# Patient Record
Sex: FEMALE | Race: WHITE | NOT HISPANIC OR LATINO | URBAN - METROPOLITAN AREA
[De-identification: names, ages, dates, MRNs, and addresses within clinical notes are randomized per-mention and may not be internally consistent; named-entity substitution may affect disease eponyms.]

---

## 2017-01-05 ENCOUNTER — IMPORTED ENCOUNTER (OUTPATIENT)
Dept: URBAN - METROPOLITAN AREA CLINIC 9 | Facility: CLINIC | Age: 67
End: 2017-01-05

## 2017-05-08 NOTE — PATIENT DISCUSSION
AMD (Wet) Counseling:  I have reviewed with the patient the diagnosis of wet macular degeneration and its pathophysiology.   I further explained that this condition is a severe eye disease which should be monitored and treated by a retinal specialist.

## 2017-10-30 NOTE — PATIENT DISCUSSION
Continue: PreserVision AREDS 2 (vit c,k-hb-bdwbr-lutein-zeaxan): capsule: 334-225-66-0 mg-unit-mg-mg 1 capsule twice a day as directed by mouth

## 2017-10-30 NOTE — PATIENT DISCUSSION
STABLE NON-EXUDATIVE AMD,OS:  PINPOINT MACULAR INTRARETINAL HEMORRHAGE OS (AMD VS. NPDR). CONTINUE AREDS 2 VITAMINS / AMSLER GRID QD/ UV PROTECTION. SMOKING AVOIDANCE REVIEWED. RETURN FOR FOLLOW-UP AS SCHEDULED.

## 2017-10-30 NOTE — PATIENT DISCUSSION
AMD (WET), OD:  REVIEWED RISK AND BENEFITS OF TREAT AND EXTEND REGIMEN VS PRN TREATMENT. THE PATIENT PREFERS TO CONTINUE WITH PRN TREATMENT.

## 2018-02-26 NOTE — PATIENT DISCUSSION
NONPROLIFERATIVE DIABETIC RETINOPATHY, OU:  STABLE.  RETURN FOR FOLLOW-UP AS SCHEDULED FOR DILATED EYE EXAM.

## 2018-02-26 NOTE — PATIENT DISCUSSION
AMD (WET), OS:  REVIEWED RISK AND BENEFITS OF TREAT AND EXTEND REGIMEN VS PRN TREATMENT. THE PATIENT PREFERS TO CONTINUE WITH PRN TREATMENT.

## 2018-02-26 NOTE — PATIENT DISCUSSION
Continue: PreserVision AREDS 2 (vit c,e-jk-voxtj-lutein-zeaxan): capsule: 515-798-53-1 mg-unit-mg-mg 1 capsule twice a day as directed by mouth

## 2018-02-26 NOTE — PATIENT DISCUSSION
STABLE NON-EXUDATIVE AMD OD:  CONTINUE AREDS 2 VITAMINS / AMSLER GRID QD/ UV PROTECTION. SMOKING AVOIDANCE REVIEWED. RETURN FOR FOLLOW-UP AS SCHEDULED.

## 2018-06-04 NOTE — PATIENT DISCUSSION
Continue: PreserVision AREDS 2 (vit c,d-lg-niypt-lutein-zeaxan): capsule: 283-783-81-1 mg-unit-mg-mg 1 capsule twice a day as directed by mouth

## 2018-09-06 NOTE — PATIENT DISCUSSION
Continue: PreserVision AREDS 2 (vit c,z-ah-pvwwc-lutein-zeaxan): capsule: 940-570-72-9 mg-unit-mg-mg 1 capsule twice a day as directed by mouth

## 2018-12-10 ENCOUNTER — IMPORTED ENCOUNTER (OUTPATIENT)
Dept: URBAN - METROPOLITAN AREA CLINIC 9 | Facility: CLINIC | Age: 68
End: 2018-12-10

## 2018-12-20 NOTE — PATIENT DISCUSSION
Continue: PreserVision AREDS 2 (vit c,e-up-rjjnm-lutein-zeaxan): capsule: 533-258-65-5 mg-unit-mg-mg 1 capsule twice a day as directed by mouth

## 2019-04-11 NOTE — PATIENT DISCUSSION
Continue: PreserVision AREDS 2 (vit c,y-hi-lkstl-lutein-zeaxan): capsule: 499-140-92-0 mg-unit-mg-mg 1 capsule twice a day as directed by mouth

## 2019-07-11 NOTE — PATIENT DISCUSSION
Continue: PreserVision AREDS 2 (vit c,b-ff-uvkdm-lutein-zeaxan): capsule: 472-358-92-4 mg-unit-mg-mg 1 capsule twice a day as directed by mouth spouse

## 2019-07-11 NOTE — PATIENT DISCUSSION
DEGENERATIVE MYOPIA, OU: REGRESSED CNV OD; NEW CNV OS WITH SUBRETINAL HEMORRHAGE ON EXAM TODAY. FOLLOW-UP AS SCHEDULED. Detail Level: Detailed Size Of Lesion: 9mm Size Of Lesion: 2mm Size Of Lesion: 5mm Size Of Lesion: 4mm

## 2019-10-31 NOTE — PATIENT DISCUSSION
Continue: PreserVision AREDS 2 (vit c,c-xw-gbwfj-lutein-zeaxan): capsule: 451-942-51-3 mg-unit-mg-mg 1 capsule twice a day as directed by mouth

## 2019-12-12 NOTE — PATIENT DISCUSSION
Continue: Refresh Optive Advanced (nfjlukxipmxmqbw-gfmlycm-[id number]): drops: 0.5-1-0.5% 1 drop as directed into both eyes

## 2019-12-12 NOTE — PATIENT DISCUSSION
Continue: PreserVision AREDS 2 (vit c,w-sc-lgviy-lutein-zeaxan): capsule: 422-448-15-2 mg-unit-mg-mg 1 capsule twice a day as directed by mouth

## 2020-01-27 NOTE — PATIENT DISCUSSION
Continue: PreserVision AREDS 2 (vit c,h-ex-nhccu-lutein-zeaxan): capsule: 986-430-51-9 mg-unit-mg-mg 1 capsule twice a day as directed by mouth

## 2020-01-27 NOTE — PATIENT DISCUSSION
Continue: Refresh Optive Advanced (abnqczfowceakoy-aftwpqb-aeaj54): drops: 0.5-1-0.5% 1 drop as directed into both eyes

## 2020-02-13 ENCOUNTER — IMPORTED ENCOUNTER (OUTPATIENT)
Dept: URBAN - METROPOLITAN AREA CLINIC 9 | Facility: CLINIC | Age: 70
End: 2020-02-13

## 2020-04-27 NOTE — PATIENT DISCUSSION
AMD (Wet) Counseling:  I have reviewed with the patient the diagnosis of wet macular degeneration and its pathophysiology.   I further explained that this condition is a severe eye disease which should be monitored and treated by a retinal specialist. 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  Had asked the [facility] if they would do it and they won't.  Schedule patient to see Mary May 5th with a f/u at the lab.

## 2021-02-25 ENCOUNTER — IMPORTED ENCOUNTER (OUTPATIENT)
Dept: URBAN - METROPOLITAN AREA CLINIC 9 | Facility: CLINIC | Age: 71
End: 2021-02-25

## 2021-06-09 ENCOUNTER — IMPORTED ENCOUNTER (OUTPATIENT)
Dept: URBAN - METROPOLITAN AREA CLINIC 9 | Facility: CLINIC | Age: 71
End: 2021-06-09

## 2021-08-19 NOTE — PATIENT DISCUSSION
Continue: PreserVision AREDS 2 (vit c,l-rf-siado-lutein-zeaxan): capsule: 211-015-07-4 mg-unit-mg-mg 1 capsule twice a day as directed by mouth

## 2021-08-19 NOTE — PATIENT DISCUSSION
Degenerative Myopia Counseling: The diagnosis of myopia (nearsightedness) was discussed with the patient. I explained to the patient that people who are nearsighted may be at an increased risk of a retinal detachment. Possible symptoms of retinal detachment including new onset of significant floaters or flashes or a sudden decrease in vision were reviewed with the patient. The patient understands that any of these symptoms require an immediate call to the office for an examination that day. Options for the correction of the patient's myopia were discussed may include glasses, contacts or elective refractive surgery. Return for follow-up as scheduled.

## 2021-08-19 NOTE — PATIENT DISCUSSION
Continue: Refresh Optive Advanced (nagfobyxppuhsxj-rbkejdy-brbr43): drops: 0.5-1-0.5% 1 drop as directed into both eyes

## 2021-09-30 NOTE — PATIENT DISCUSSION
Hospital Outpatient Visit on 09/23/2021   Component Date Value Ref Range Status    Hemoglobin A1C 09/23/2021 6.0  4.0 - 6.0 % Final    Estimated Avg Glucose 09/23/2021 126  mg/dL Final    Comment: The ADA and AACC recommend providing the estimated average glucose result to permit better   patient understanding of their HBA1c result.       WBC 09/23/2021 6.7  3.5 - 11.3 k/uL Final    RBC 09/23/2021 5.11  3.95 - 5.11 m/uL Final    Hemoglobin 09/23/2021 13.3  11.9 - 15.1 g/dL Final    Hematocrit 09/23/2021 42.4  36.3 - 47.1 % Final    MCV 09/23/2021 83.0  82.6 - 102.9 fL Final    MCH 09/23/2021 26.0  25.2 - 33.5 pg Final    MCHC 09/23/2021 31.4  25.2 - 33.5 g/dL Final    RDW 09/23/2021 13.3  11.8 - 14.4 % Final    Platelets 82/09/4250 341  138 - 453 k/uL Final    MPV 09/23/2021 9.2  8.1 - 13.5 fL Final    NRBC Automated 09/23/2021 0.0  0.0 per 100 WBC Final    Differential Type 09/23/2021 NOT REPORTED   Final    Seg Neutrophils 09/23/2021 44  36 - 65 % Final    Lymphocytes 09/23/2021 42  24 - 43 % Final    Monocytes 09/23/2021 9  3 - 12 % Final    Eosinophils % 09/23/2021 4  1 - 4 % Final    Basophils 09/23/2021 1  0 - 2 % Final    Immature Granulocytes 09/23/2021 0  0 % Final    Segs Absolute 09/23/2021 2.99  1.50 - 8.10 k/uL Final    Absolute Lymph # 09/23/2021 2.79  1.10 - 3.70 k/uL Final    Absolute Mono # 09/23/2021 0.59  0.10 - 1.20 k/uL Final    Absolute Eos # 09/23/2021 0.23  0.00 - 0.44 k/uL Final    Basophils Absolute 09/23/2021 0.05  0.00 - 0.20 k/uL Final    Absolute Immature Granulocyte 09/23/2021 <0.03  0.00 - 0.30 k/uL Final    WBC Morphology 09/23/2021 NOT REPORTED   Final    RBC Morphology 09/23/2021 NOT REPORTED   Final    Platelet Estimate 13/16/8776 NOT REPORTED   Final    Glucose 09/23/2021 107* 70 - 99 mg/dL Final    BUN 09/23/2021 15  8 - 23 mg/dL Final    CREATININE 09/23/2021 0.84  0.50 - 0.90 mg/dL Final    Bun/Cre Ratio 09/23/2021 18  9 - 20 Final    Calcium Myopia Counseling: The diagnosis of myopia (nearsightedness) was discussed with the patient. I explained to the patient that people who are nearsighted may be at an increased risk of a retinal detachment. Possible symptoms of retinal detachment including new onset of significant floaters or flashes or a sudden decrease in vision were reviewed with the patient. The patient understands that any of these symptoms require an immediate call to the office for an examination that day. Options for the correction of the patient's myopia were discussed may include glasses, contacts or elective refractive surgery. Return for follow-up as scheduled. 09/23/2021 9.5  8.6 - 10.4 mg/dL Final    Sodium 09/23/2021 141  135 - 144 mmol/L Final    Potassium 09/23/2021 4.5  3.7 - 5.3 mmol/L Final    Chloride 09/23/2021 103  98 - 107 mmol/L Final    CO2 09/23/2021 29  20 - 31 mmol/L Final    Anion Gap 09/23/2021 9  9 - 17 mmol/L Final    Alkaline Phosphatase 09/23/2021 118* 35 - 104 U/L Final    ALT 09/23/2021 18  5 - 33 U/L Final    AST 09/23/2021 17  <32 U/L Final    Total Bilirubin 09/23/2021 0.37  0.3 - 1.2 mg/dL Final    Total Protein 09/23/2021 8.2  6.4 - 8.3 g/dL Final    Albumin 09/23/2021 4.1  3.5 - 5.2 g/dL Final    Albumin/Globulin Ratio 09/23/2021 1.0  1.0 - 2.5 Final    GFR Non- 09/23/2021 >60  >60 mL/min Final    GFR  09/23/2021 >60  >60 mL/min Final    GFR Comment 09/23/2021        Final    Comment: Average GFR for 79or more years old:   76 mL/min/1.73sq m  Chronic Kidney Disease:   <60 mL/min/1.73sq m  Kidney failure:   <15 mL/min/1.73sq m              eGFR calculated using average adult body mass. Additional eGFR calculator available at:        [email or web address]            GFR Staging 09/23/2021 NOT REPORTED   Final    Cholesterol 09/23/2021 218* <200 mg/dL Final    Comment:    Cholesterol Guidelines:      <200  Desirable   200-240  Borderline      >240  Undesirable         HDL 09/23/2021 54  >40 mg/dL Final    Comment:    HDL Guidelines:    <40     Undesirable   40-59    Borderline    >59     Desirable         LDL Cholesterol 09/23/2021 130  0 - 130 mg/dL Final    Comment:    LDL Guidelines:     <100    Desirable   100-129   Near to/above Desirable   130-159   Borderline      >159   Undesirable     Direct (measured) LDL and calculated LDL are not interchangeable tests.       Chol/HDL Ratio 09/23/2021 4.0  <5 Final            Triglycerides 09/23/2021 168* <150 mg/dL Final    Comment:    Triglyceride Guidelines:     <150   Desirable   150-199  Borderline   200-499  High >499   Very high   Based on AHA Guidelines for fasting triglyceride, October 2012.          VLDL 09/23/2021 NOT REPORTED* 1 - 30 mg/dL Final

## 2021-10-04 NOTE — PATIENT DISCUSSION
We discussed the natural history of this disease and the risks of permanent vision loss from wet AMD and therefore the need for close and indefinite retinal follow-up.

## 2021-10-04 NOTE — PROCEDURE NOTE: CLINICAL
PROCEDURE NOTE: Avastin () OS. Anesthesia: Proparacaine 0.5%. Prep: Betadine Drops and Betadine Scrub. Prior to the original injection, risks/benefits/alternatives discussed including infection, loss of vision, hemorrhage, cataract, glaucoma, retinal tears or detachment. A written consent is on file, and the need for today’s injection was discussed and the patient is understanding and wishes to proceed. The off-label status of Intravitreal Avastin also was reviewed. The patient wished to proceed with treatment. The patient wished to proceed with treatment. Topical anesthetic drops were applied to the eye. Betadine prep was performed. Surgical mask worn. Sterile drape and lid speculum were applied. Using the syringe provided, Avastin 1.25 mg in 0.05 cc was injected into the vitreous cavity. Injection site: 3-4 mm from the limbus. Patient tolerated procedure well. Following the intravitreal injection, the sterile lid speculum was removed. CRA perfusion confirmed. CF vision checked. Patient given office phone number/answering service number and advised to call immediately should there be an increase in floaters or redness, loss of vision or pain, or should they have any other questions or concerns. Nish Armstrong

## 2021-10-18 ASSESSMENT — VISUAL ACUITY
OS_CC: 20/20 - SN
OD_CC: 20/20 - SN
OS_CC: 20/25 SN
OS_CC: 20/25 SN
OS_CC: 20/20 - SN
OS_CC: 20/30 +2 SN
OD_CC: 20/20 SN
OD_CC: 20/25 + SN
OD_CC: 20/25 -2 SN
OS_CC: 20/20 SN
OD_CC: 20/25 SN
OS_CC: 20/25 SN
OD_CC: 20/20 SN
OS_CC: 20/40 SN
OD_CC: 20/20 - SN
OD_CC: 20/20 -2 SN
OD_CC: 20/25 SN
OS_CC: 20/25 + SN
OD_CC: 20/25 -2 SN

## 2021-10-18 ASSESSMENT — KERATOMETRY
OD_K1POWER_DIOPTERS: 43.75
OS_AXISANGLE_DEGREES: 177
OD_AXISANGLE_DEGREES: 75
OD_K1POWER_DIOPTERS: 44
OS_K2POWER_DIOPTERS: 44.25
OS_K2POWER_DIOPTERS: 44.25
OD_AXISANGLE2_DEGREES: 165
OD_AXISANGLE_DEGREES: 86
OS_AXISANGLE2_DEGREES: 172
OD_K1POWER_DIOPTERS: 44
OD_K2POWER_DIOPTERS: 44.5
OS_K2POWER_DIOPTERS: 44.25
OS_AXISANGLE2_DEGREES: 160
OS_K1POWER_DIOPTERS: 43.5
OD_K2POWER_DIOPTERS: 44.25
OS_AXISANGLE2_DEGREES: 87
OS_K1POWER_DIOPTERS: 43.25
OS_K1POWER_DIOPTERS: 43.75
OD_AXISANGLE_DEGREES: 84
OD_AXISANGLE2_DEGREES: 174
OS_AXISANGLE_DEGREES: 82
OD_K2POWER_DIOPTERS: 44.5
OD_AXISANGLE2_DEGREES: 176
OS_AXISANGLE_DEGREES: 70

## 2021-10-18 ASSESSMENT — TONOMETRY
OD_IOP_MMHG: 10
OS_IOP_MMHG: 13
OD_IOP_MMHG: 12
OS_IOP_MMHG: 10
OD_IOP_MMHG: 15
OS_IOP_MMHG: 13
OS_IOP_MMHG: 14
OD_IOP_MMHG: 13

## 2021-11-11 NOTE — PROCEDURE NOTE: CLINICAL
PROCEDURE NOTE: Avastin () OS. Diagnosis: Neovascular AMD with Active CNV. Anesthesia: Topical/Subconjunctival. Prep: Betadine Drops and Betadine Scrub. Betadine prep was performed. Product is within expiration date, and has been verified. An unopened 30 g needle was securely affixed to the 1 cc syringe. Excess drug and air bubbles were carefully expressed such that the plunger aligned with the .05 mL vel on the syringe. A lid speculum was used. After the Betadine prep, intravitreal injection of Avastin 1.25mg/0.05 ml was given. Injection site: 3-4 mm from the limbus. The needle was withdrawn; retinal perfusion was verified. Lid speculum removed. The eye was irrigated with sterile irrigating solution. The betadine was washed away. Patient tolerated procedure well. Count fingers vision was verified. There were no complications. Patient given office phone number/answering service phone number. Post-injection instructions were reviewed and understood. Symptoms of RD and endophthalmitis following intravitreal injection were discussed. Patient advised to call right away if any loss of vision, new floaters, or eye pain. Post-op instructions given. Patient was given the standard instruction sheet. Appropriate follow-up was arranged. Karen Gunter

## 2021-12-09 NOTE — PROCEDURE NOTE: CLINICAL
PROCEDURE NOTE: Avastin () OS. Diagnosis: Neovascular AMD with Active CNV. Anesthesia: Topical/Subconjunctival. Prep: Betadine Drops and Betadine Scrub. Betadine prep was performed. Product is within expiration date, and has been verified. An unopened 30 g needle was securely affixed to the 1 cc syringe. Excess drug and air bubbles were carefully expressed such that the plunger aligned with the .05 mL vel on the syringe. A lid speculum was used. After the Betadine prep, intravitreal injection of Avastin 1.25mg/0.05 ml was given. Injection site: 3-4 mm from the limbus. The needle was withdrawn; retinal perfusion was verified. Lid speculum removed. The eye was irrigated with sterile irrigating solution. The betadine was washed away. Patient tolerated procedure well. Count fingers vision was verified. There were no complications. Patient given office phone number/answering service phone number. Post-injection instructions were reviewed and understood. Symptoms of RD and endophthalmitis following intravitreal injection were discussed. Patient advised to call right away if any loss of vision, new floaters, or eye pain. Post-op instructions given. Patient was given the standard instruction sheet. Appropriate follow-up was arranged. Elina Carpenter

## 2022-01-27 NOTE — PROCEDURE NOTE: CLINICAL
PROCEDURE NOTE: Avastin () OS. Diagnosis: Neovascular AMD with Active CNV. Anesthesia: Topical/Subconjunctival. Prep: Betadine Drops and Betadine Scrub. Betadine prep was performed. Product is within expiration date, and has been verified. An unopened 30 g needle was securely affixed to the 1 cc syringe. Excess drug and air bubbles were carefully expressed such that the plunger aligned with the .05 mL vel on the syringe. A lid speculum was used. After the Betadine prep, intravitreal injection of Avastin 1.25mg/0.05 ml was given. Injection site: 3-4 mm from the limbus. The needle was withdrawn; retinal perfusion was verified. Lid speculum removed. The eye was irrigated with sterile irrigating solution. The betadine was washed away. Patient tolerated procedure well. Count fingers vision was verified. There were no complications. Patient given office phone number/answering service phone number. Post-injection instructions were reviewed and understood. Symptoms of RD and endophthalmitis following intravitreal injection were discussed. Patient advised to call right away if any loss of vision, new floaters, or eye pain. Post-op instructions given. Patient was given the standard instruction sheet. Appropriate follow-up was arranged. Tha Hobbs

## 2022-03-30 ENCOUNTER — ESTABLISHED PATIENT (OUTPATIENT)
Dept: URBAN - METROPOLITAN AREA CLINIC 4 | Facility: CLINIC | Age: 72
End: 2022-03-30

## 2022-03-30 DIAGNOSIS — H02.834: ICD-10-CM

## 2022-03-30 DIAGNOSIS — H02.831: ICD-10-CM

## 2022-03-30 DIAGNOSIS — H11.153: ICD-10-CM

## 2022-03-30 DIAGNOSIS — E11.9: ICD-10-CM

## 2022-03-30 PROCEDURE — 92015 DETERMINE REFRACTIVE STATE: CPT

## 2022-03-30 PROCEDURE — 92014 COMPRE OPH EXAM EST PT 1/>: CPT

## 2022-03-30 ASSESSMENT — TONOMETRY
OD_IOP_MMHG: 13
OS_IOP_MMHG: 14

## 2022-03-30 ASSESSMENT — VISUAL ACUITY
OU_CC: 20/20
OS_CC: 20/25-1
OD_GLARE: 20/60
OS_GLARE: 20/50
OD_CC: 20/25

## 2022-03-30 ASSESSMENT — KERATOMETRY
OS_K1POWER_DIOPTERS: 43.25
OS_AXISANGLE_DEGREES: 72
OD_K2POWER_DIOPTERS: 44.25
OS_K2POWER_DIOPTERS: 44.50
OD_AXISANGLE2_DEGREES: 4
OS_AXISANGLE2_DEGREES: 162
OD_K1POWER_DIOPTERS: 43.50
OD_AXISANGLE_DEGREES: 94

## 2022-04-21 NOTE — PROCEDURE NOTE: CLINICAL
PROCEDURE NOTE: Avastin () OS. Diagnosis: Neovascular AMD with Active CNV. Anesthesia: Topical/Subconjunctival. Prep: Betadine Drops and Betadine Scrub. Betadine prep was performed. Product is within expiration date, and has been verified. An unopened 30 g needle was securely affixed to the 1 cc syringe. Excess drug and air bubbles were carefully expressed such that the plunger aligned with the .05 mL vel on the syringe. A lid speculum was used. After the Betadine prep, intravitreal injection of Avastin 1.25mg/0.05 ml was given. Injection site: 3-4 mm from the limbus. The needle was withdrawn; retinal perfusion was verified. Lid speculum removed. The eye was irrigated with sterile irrigating solution. The betadine was washed away. Patient tolerated procedure well. Count fingers vision was verified. There were no complications. Patient given office phone number/answering service phone number. Post-injection instructions were reviewed and understood. Symptoms of RD and endophthalmitis following intravitreal injection were discussed. Patient advised to call right away if any loss of vision, new floaters, or eye pain. Post-op instructions given. Patient was given the standard instruction sheet. Appropriate follow-up was arranged. Angeli Justin

## 2022-06-30 NOTE — PROCEDURE NOTE: CLINICAL
PROCEDURE NOTE: Avastin () OS. Diagnosis: Neovascular AMD with Active CNV. Anesthesia: Topical/Subconjunctival. Prep: Betadine Drops and Betadine Scrub. Betadine prep was performed. Product is within expiration date, and has been verified. An unopened 30 g needle was securely affixed to the 1 cc syringe. Excess drug and air bubbles were carefully expressed such that the plunger aligned with the .05 mL vel on the syringe. A lid speculum was used. After the Betadine prep, intravitreal injection of Avastin 1.25mg/0.05 ml was given. Injection site: 3-4 mm from the limbus. The needle was withdrawn; retinal perfusion was verified. Lid speculum removed. The eye was irrigated with sterile irrigating solution. The betadine was washed away. Patient tolerated procedure well. Count fingers vision was verified. There were no complications. Patient given office phone number/answering service phone number. Post-injection instructions were reviewed and understood. Symptoms of RD and endophthalmitis following intravitreal injection were discussed. Patient advised to call right away if any loss of vision, new floaters, or eye pain. Post-op instructions given. Patient was given the standard instruction sheet. Appropriate follow-up was arranged. Sha Brown

## 2022-12-15 NOTE — PROCEDURE NOTE: CLINICAL
PROCEDURE NOTE: Avastin () OS. Diagnosis: Neovascular AMD with Active CNV. Anesthesia: Topical/Subconjunctival. Prep: Betadine Drops and Betadine Scrub. Betadine prep was performed. Product is within expiration date, and has been verified. An unopened 30 g needle was securely affixed to the 1 cc syringe. Excess drug and air bubbles were carefully expressed such that the plunger aligned with the .05 mL vel on the syringe. A lid speculum was used. After the Betadine prep, intravitreal injection of Avastin 1.25mg/0.05 ml was given. Injection site: 3-4 mm from the limbus. The needle was withdrawn; retinal perfusion was verified. Lid speculum removed. The eye was irrigated with sterile irrigating solution. The betadine was washed away. Patient tolerated procedure well. Count fingers vision was verified. There were no complications. Patient given office phone number/answering service phone number. Post-injection instructions were reviewed and understood. Symptoms of RD and endophthalmitis following intravitreal injection were discussed. Patient advised to call right away if any loss of vision, new floaters, or eye pain. Post-op instructions given. Patient was given the standard instruction sheet. Appropriate follow-up was arranged. Tiffani George

## 2024-05-08 ENCOUNTER — ESTABLISHED PATIENT (OUTPATIENT)
Facility: LOCATION | Age: 74
End: 2024-05-08

## 2024-05-08 DIAGNOSIS — E11.9: ICD-10-CM

## 2024-05-08 DIAGNOSIS — H11.153: ICD-10-CM

## 2024-05-08 DIAGNOSIS — H02.831: ICD-10-CM

## 2024-05-08 DIAGNOSIS — H02.834: ICD-10-CM

## 2024-05-08 PROCEDURE — 92015 DETERMINE REFRACTIVE STATE: CPT

## 2024-05-08 PROCEDURE — 92014 COMPRE OPH EXAM EST PT 1/>: CPT

## 2024-05-08 ASSESSMENT — VISUAL ACUITY
OU_SC: 20/25
OD_SC: 20/30
OS_SC: 20/25

## 2024-05-08 ASSESSMENT — TONOMETRY
OD_IOP_MMHG: 13
OS_IOP_MMHG: 14

## 2025-06-17 NOTE — PROCEDURE NOTE: CLINICAL
PROCEDURE NOTE: Avastin () OS. Diagnosis: Neovascular AMD with Active CNV. Anesthesia: Topical/Subconjunctival. Prep: Betadine Drops and Betadine Scrub. Betadine prep was performed. Product is within expiration date, and has been verified. An unopened 30 g needle was securely affixed to the 1 cc syringe. Excess drug and air bubbles were carefully expressed such that the plunger aligned with the .05 mL vel on the syringe. A lid speculum was used. After the Betadine prep, intravitreal injection of Avastin 1.25mg/0.05 ml was given. Injection site: 3-4 mm from the limbus. The needle was withdrawn; retinal perfusion was verified. Lid speculum removed. The eye was irrigated with sterile irrigating solution. The betadine was washed away. Patient tolerated procedure well. Count fingers vision was verified. There were no complications. Patient given office phone number/answering service phone number. Post-injection instructions were reviewed and understood. Symptoms of RD and endophthalmitis following intravitreal injection were discussed. Patient advised to call right away if any loss of vision, new floaters, or eye pain. Post-op instructions given. Patient was given the standard instruction sheet. Appropriate follow-up was arranged. Dave Gibson Patient advised as per PA Wili.  Verbalized understanding of the recommendations.  Will go to  to  specimen containers.